# Patient Record
Sex: FEMALE | Race: WHITE | URBAN - METROPOLITAN AREA
[De-identification: names, ages, dates, MRNs, and addresses within clinical notes are randomized per-mention and may not be internally consistent; named-entity substitution may affect disease eponyms.]

---
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
## 2024-08-30 ENCOUNTER — TELEPHONE (OUTPATIENT)
Dept: OPHTHALMOLOGY | Facility: CLINIC | Age: 24
End: 2024-08-30
Payer: MEDICAID

## 2024-09-09 ENCOUNTER — TELEPHONE (OUTPATIENT)
Dept: OPHTHALMOLOGY | Facility: CLINIC | Age: 24
End: 2024-09-09
Payer: COMMERCIAL

## 2024-09-09 NOTE — TELEPHONE ENCOUNTER
----- Message from Beryl Dye sent at 9/9/2024 11:16 AM CDT -----  Regarding: Consult/Advisory  Contact: pt @ [phone]  Consult/Advisory     Name Of Caller: Tasneem Elias    Contact Preference: [phone]     Nature of call: message is for [facility] Can missed call to get appt. Asking for a call back

## 2025-02-17 ENCOUNTER — TELEPHONE (OUTPATIENT)
Dept: OPHTHALMOLOGY | Facility: CLINIC | Age: 25
End: 2025-02-17
Payer: COMMERCIAL

## 2025-02-17 NOTE — TELEPHONE ENCOUNTER
----- Message from Imaxio sent at 2/17/2025  8:50 AM CST -----  Regarding: Returning a Missed Call  Contact: Tasneem Elias  Returning a Missed Call Caller:Tasneem Elias   Returning call to: Pamella  Caller can be reached @:[phone]  Nature of the call:Patient is returning call to schedule. Requesting a call back

## 2025-06-06 ENCOUNTER — TELEPHONE (OUTPATIENT)
Dept: OPHTHALMOLOGY | Facility: CLINIC | Age: 25
End: 2025-06-06
Payer: COMMERCIAL